# Patient Record
Sex: MALE | URBAN - METROPOLITAN AREA
[De-identification: names, ages, dates, MRNs, and addresses within clinical notes are randomized per-mention and may not be internally consistent; named-entity substitution may affect disease eponyms.]

---

## 2018-09-13 ENCOUNTER — DOCUMENTATION ONLY (OUTPATIENT)
Dept: MATERNAL FETAL MEDICINE | Facility: CLINIC | Age: 33
End: 2018-09-13

## 2018-09-13 DIAGNOSIS — Z13.71 SCREENING FOR GENETIC DISEASE CARRIER STATUS: Primary | ICD-10-CM

## 2018-09-13 PROCEDURE — 36415 COLL VENOUS BLD VENIPUNCTURE: CPT | Performed by: OBSTETRICS & GYNECOLOGY

## 2018-09-13 NOTE — PROGRESS NOTES
[facility]  Genetic Counseling Consult    Patient: Tres Sandoval YOB: 1985   Date of Service: 9/13/18      Tres Sandoval was seen with his wife, Victorina, at [facility] for genetic consultation to discuss the options for carrier screening for alpha thalassemia.         Impression/Plan:   1.  Tres had a blood draw for carrier screening (PreParent test through [facility]). Results are expected within 7-10 days, and will be available in [facility].  We will contact him at [phone] to discuss the results. He provided verbal permission to leave detailed results in a voicemail.     Carrier Screening:   Tres's wife was recently found to be a silent carrier for alpha thalassemia.        Alpha thalassemia is caused by changes in the alpha globin genes. Typically, people have four total copies of the alpha globin genes (two genes on each copy of chromosome 16). In some people one or more of these genes is deleted or has another change causing it to not work correctly. When all four copies are working correctly, a person does not have alpha thalassemia. When only three copies are present/working correctly that person is said to be a silent carrier for alpha thalassemia and typically has no symptoms. People with two working copies are said to have alpha thalassemia trait and may have very mild anemia. People with one working copy of an alpha globin gene have Hemoglobin H (Hb H) disease, which is characterized by moderate to severe anemia that may required interventions such as blood transfusions or splenectomy. People with zero working copies of the alpha globin genes have Hemoglobin Nathan (Hb Nathan) syndrome, which causes severe anemia in utero and hydrops fetalis, often leading to pregnancy loss.        We discussed that the risk for Kiya's children to have alpha thalassemia depends on whether or not Tres is a  carrier. Tres may have normal alpha globin genes, be a silent carrier for alpha thalassemia, or have alpha-thalassemia trait. The risks to the pregnancy are different in each scenario. Without knowing Luke genetics, the most serious risk is for Hb H disease.     Victorina and Tres elected for Tres to pursue carrier screening (Preparent through [facility]) today. We discussed options for carrier screening through alternate labs such as [facility] or [facility]. Victorina and her  stated their understanding in the differences between the testing these labs offer. Due to the timeline they elected to pursue carrier screening through [facility].      It was a pleasure to be involved with Tres diaz care. Face-to-face time of the meeting was 30 minutes.    Mirtha Bauman [facility] [place]  Certified Genetic Counselor  [phone]

## 2018-09-20 ENCOUNTER — TELEPHONE (OUTPATIENT)
Dept: MATERNAL FETAL MEDICINE | Facility: CLINIC | Age: 33
End: 2018-09-20

## 2018-09-20 NOTE — TELEPHONE ENCOUNTER
Left message for Tres that his [facility] PreParent (carrier screening) results were negative and he was not found to be a carrier for alpha thalassemia or the 27 other recessive genetic conditions on the panel. This significantly reduces his risk to have a child with any of these conditions. In particular, this means the risk to have a child with Hb H disease is very low.    Results will be available in his EPIC chart for  Review.      Mirtha Bauman MS, [facility]  Genetic Counselor  [phone]

## 2018-09-23 LAB — LAB SCANNED RESULT: NORMAL
